# Patient Record
Sex: FEMALE | Race: WHITE | NOT HISPANIC OR LATINO | URBAN - METROPOLITAN AREA
[De-identification: names, ages, dates, MRNs, and addresses within clinical notes are randomized per-mention and may not be internally consistent; named-entity substitution may affect disease eponyms.]

---

## 2018-04-15 ENCOUNTER — INPATIENT (INPATIENT)
Facility: HOSPITAL | Age: 78
LOS: 2 days | Discharge: NURSING, REHAB OR LONG-TERM CARE | End: 2018-04-18
Attending: HOSPITALIST | Admitting: HOSPITALIST
Payer: MEDICARE

## 2018-04-15 VITALS
OXYGEN SATURATION: 100 % | HEART RATE: 88 BPM | TEMPERATURE: 100 F | RESPIRATION RATE: 16 BRPM | DIASTOLIC BLOOD PRESSURE: 80 MMHG | SYSTOLIC BLOOD PRESSURE: 183 MMHG

## 2018-04-15 DIAGNOSIS — M15.0 PRIMARY GENERALIZED (OSTEO)ARTHRITIS: ICD-10-CM

## 2018-04-15 DIAGNOSIS — Z29.9 ENCOUNTER FOR PROPHYLACTIC MEASURES, UNSPECIFIED: ICD-10-CM

## 2018-04-15 DIAGNOSIS — R53.1 WEAKNESS: ICD-10-CM

## 2018-04-15 LAB
ALBUMIN SERPL ELPH-MCNC: 4 G/DL — SIGNIFICANT CHANGE UP (ref 3.3–5)
ALP SERPL-CCNC: 126 U/L — HIGH (ref 40–120)
ALT FLD-CCNC: 10 U/L — SIGNIFICANT CHANGE UP (ref 4–33)
APPEARANCE UR: CLEAR — SIGNIFICANT CHANGE UP
AST SERPL-CCNC: 31 U/L — SIGNIFICANT CHANGE UP (ref 4–32)
BASOPHILS # BLD AUTO: 0.08 K/UL — SIGNIFICANT CHANGE UP (ref 0–0.2)
BASOPHILS NFR BLD AUTO: 1.3 % — SIGNIFICANT CHANGE UP (ref 0–2)
BILIRUB SERPL-MCNC: 0.4 MG/DL — SIGNIFICANT CHANGE UP (ref 0.2–1.2)
BILIRUB UR-MCNC: NEGATIVE — SIGNIFICANT CHANGE UP
BLOOD UR QL VISUAL: NEGATIVE — SIGNIFICANT CHANGE UP
BUN SERPL-MCNC: 15 MG/DL — SIGNIFICANT CHANGE UP (ref 7–23)
CALCIUM SERPL-MCNC: 9.2 MG/DL — SIGNIFICANT CHANGE UP (ref 8.4–10.5)
CHLORIDE SERPL-SCNC: 103 MMOL/L — SIGNIFICANT CHANGE UP (ref 98–107)
CK MB BLD-MCNC: 3.01 NG/ML — SIGNIFICANT CHANGE UP (ref 1–4.7)
CK MB BLD-MCNC: SIGNIFICANT CHANGE UP (ref 0–2.5)
CK SERPL-CCNC: 103 U/L — SIGNIFICANT CHANGE UP (ref 25–170)
CO2 SERPL-SCNC: 24 MMOL/L — SIGNIFICANT CHANGE UP (ref 22–31)
COLOR SPEC: SIGNIFICANT CHANGE UP
CREAT SERPL-MCNC: 0.67 MG/DL — SIGNIFICANT CHANGE UP (ref 0.5–1.3)
CRP SERPL-MCNC: < 5 MG/L — SIGNIFICANT CHANGE UP
EOSINOPHIL # BLD AUTO: 0.08 K/UL — SIGNIFICANT CHANGE UP (ref 0–0.5)
EOSINOPHIL NFR BLD AUTO: 1.3 % — SIGNIFICANT CHANGE UP (ref 0–6)
ERYTHROCYTE [SEDIMENTATION RATE] IN BLOOD: 6 MM/HR — SIGNIFICANT CHANGE UP (ref 4–25)
GLUCOSE SERPL-MCNC: 88 MG/DL — SIGNIFICANT CHANGE UP (ref 70–99)
GLUCOSE UR-MCNC: NEGATIVE — SIGNIFICANT CHANGE UP
HCT VFR BLD CALC: 37.7 % — SIGNIFICANT CHANGE UP (ref 34.5–45)
HGB BLD-MCNC: 12.3 G/DL — SIGNIFICANT CHANGE UP (ref 11.5–15.5)
IMM GRANULOCYTES # BLD AUTO: 0.01 # — SIGNIFICANT CHANGE UP
IMM GRANULOCYTES NFR BLD AUTO: 0.2 % — SIGNIFICANT CHANGE UP (ref 0–1.5)
KETONES UR-MCNC: NEGATIVE — SIGNIFICANT CHANGE UP
LEUKOCYTE ESTERASE UR-ACNC: HIGH
LYMPHOCYTES # BLD AUTO: 1.16 K/UL — SIGNIFICANT CHANGE UP (ref 1–3.3)
LYMPHOCYTES # BLD AUTO: 18.3 % — SIGNIFICANT CHANGE UP (ref 13–44)
MAGNESIUM SERPL-MCNC: 2.2 MG/DL — SIGNIFICANT CHANGE UP (ref 1.6–2.6)
MCHC RBC-ENTMCNC: 30.4 PG — SIGNIFICANT CHANGE UP (ref 27–34)
MCHC RBC-ENTMCNC: 32.6 % — SIGNIFICANT CHANGE UP (ref 32–36)
MCV RBC AUTO: 93.3 FL — SIGNIFICANT CHANGE UP (ref 80–100)
MONOCYTES # BLD AUTO: 0.61 K/UL — SIGNIFICANT CHANGE UP (ref 0–0.9)
MONOCYTES NFR BLD AUTO: 9.6 % — SIGNIFICANT CHANGE UP (ref 2–14)
MUCOUS THREADS # UR AUTO: SIGNIFICANT CHANGE UP
NEUTROPHILS # BLD AUTO: 4.41 K/UL — SIGNIFICANT CHANGE UP (ref 1.8–7.4)
NEUTROPHILS NFR BLD AUTO: 69.3 % — SIGNIFICANT CHANGE UP (ref 43–77)
NITRITE UR-MCNC: NEGATIVE — SIGNIFICANT CHANGE UP
NRBC # FLD: 0 — SIGNIFICANT CHANGE UP
PH UR: 6.5 — SIGNIFICANT CHANGE UP (ref 4.6–8)
PHOSPHATE SERPL-MCNC: 3.9 MG/DL — SIGNIFICANT CHANGE UP (ref 2.5–4.5)
PLATELET # BLD AUTO: 208 K/UL — SIGNIFICANT CHANGE UP (ref 150–400)
PMV BLD: 9.8 FL — SIGNIFICANT CHANGE UP (ref 7–13)
POTASSIUM SERPL-MCNC: 5.8 MMOL/L — HIGH (ref 3.5–5.3)
POTASSIUM SERPL-SCNC: 5.8 MMOL/L — HIGH (ref 3.5–5.3)
PROT SERPL-MCNC: 6.4 G/DL — SIGNIFICANT CHANGE UP (ref 6–8.3)
PROT UR-MCNC: 10 MG/DL — SIGNIFICANT CHANGE UP
RBC # BLD: 4.04 M/UL — SIGNIFICANT CHANGE UP (ref 3.8–5.2)
RBC # FLD: 13.2 % — SIGNIFICANT CHANGE UP (ref 10.3–14.5)
RBC CASTS # UR COMP ASSIST: SIGNIFICANT CHANGE UP (ref 0–?)
SODIUM SERPL-SCNC: 141 MMOL/L — SIGNIFICANT CHANGE UP (ref 135–145)
SP GR SPEC: 1.01 — SIGNIFICANT CHANGE UP (ref 1–1.04)
SQUAMOUS # UR AUTO: SIGNIFICANT CHANGE UP
TSH SERPL-MCNC: 1.15 UIU/ML — SIGNIFICANT CHANGE UP (ref 0.27–4.2)
UROBILINOGEN FLD QL: NORMAL MG/DL — SIGNIFICANT CHANGE UP
WBC # BLD: 6.35 K/UL — SIGNIFICANT CHANGE UP (ref 3.8–10.5)
WBC # FLD AUTO: 6.35 K/UL — SIGNIFICANT CHANGE UP (ref 3.8–10.5)
WBC UR QL: HIGH (ref 0–?)

## 2018-04-15 PROCEDURE — 71045 X-RAY EXAM CHEST 1 VIEW: CPT | Mod: 26

## 2018-04-15 PROCEDURE — 99223 1ST HOSP IP/OBS HIGH 75: CPT

## 2018-04-15 PROCEDURE — 72170 X-RAY EXAM OF PELVIS: CPT | Mod: 26

## 2018-04-15 RX ORDER — CELECOXIB 200 MG/1
200 CAPSULE ORAL DAILY
Qty: 0 | Refills: 0 | Status: DISCONTINUED | OUTPATIENT
Start: 2018-04-15 | End: 2018-04-18

## 2018-04-15 RX ORDER — ENOXAPARIN SODIUM 100 MG/ML
40 INJECTION SUBCUTANEOUS EVERY 24 HOURS
Qty: 0 | Refills: 0 | Status: DISCONTINUED | OUTPATIENT
Start: 2018-04-15 | End: 2018-04-18

## 2018-04-15 NOTE — ED ADULT TRIAGE NOTE - CHIEF COMPLAINT QUOTE
Pt reports  c/o legs and hips pain 2/2 mechanical fall this am Pt denies LOC or anticoagulant therapy, Positive impact to forehead no lacerations or hematoma noted.  Pt reports becoming progressively less-ambulatory and progressively wprsening urinary incontinence since a fall in february after which no imaging of hips were done.

## 2018-04-15 NOTE — H&P ADULT - NSHPLABSRESULTS_GEN_ALL_CORE
04-15    141  |  103  |  15  ----------------------------<  88  5.8<H>   |  24  |  0.67    Ca    9.2      15 Apr 2018 16:00  Phos  3.9     04-15  Mg     2.2     04-15    TPro  6.4  /  Alb  4.0  /  TBili  0.4  /  DBili  x   /  AST  31  /  ALT  10  /  AlkPhos  126<H>  04-15                            12.3   6.35  )-----------( 208      ( 15 Apr 2018 16:00 )             37.7       CARDIAC MARKERS ( 15 Apr 2018 16:00 )  x     / x     / 103 u/L / 3.01 ng/mL / x                Urinalysis Basic - ( 15 Apr 2018 16:05 )    Color: PLYEL / Appearance: CLEAR / S.012 / pH: 6.5  Gluc: NEGATIVE / Ketone: NEGATIVE  / Bili: NEGATIVE / Urobili: NORMAL mg/dL   Blood: NEGATIVE / Protein: 10 mg/dL / Nitrite: NEGATIVE   Leuk Esterase: SMALL / RBC: 0-2 / WBC 5-10   Sq Epi: OCC / Non Sq Epi: x / Bacteria: x    CXR film reviewed: clear lungs  Xray pelvis reviewed: no fracture

## 2018-04-15 NOTE — H&P ADULT - HISTORY OF PRESENT ILLNESS
78 y/o F without significant PMH p/w difficulty ambulating and hip florentin 78 y/o F without significant PMH p/w difficulty ambulating and pain in hips, knees and lower back.  Pt reports arthritis in her hips and knees in the past.  She fell in February, and since then has had worse pain in hips and knees and lower back than prior.  She has had difficulty walking 2/2 the pain, and over the past 2 months has felt progressively weaker.  She mostly experiences pain on standing and walking, and has relief when sitting or laying down.  She has not had numbness or paresthesia.  She takes ibuprofen which provides temporary relief.

## 2018-04-15 NOTE — ED ADULT NURSE NOTE - OBJECTIVE STATEMENT
Pt received in 316, aaox3 with c/o ongoing b/l hip pain worse r>l, lower back pain and b/l lower extremity pain r/t a fall in February 2018. Pt states that she has had chronic pain since her fall which limits her ability to ambulate. Denies any recent falls or injuries. Pt able to transfer with assist and ambulate short distances with assist. Denies the use of any assistive devices to ambulate.

## 2018-04-15 NOTE — ED PROVIDER NOTE - ATTENDING CONTRIBUTION TO CARE
[place]   pt presenting with 2 mos of worsening difficulty with ambulation which began with a fall  She notes b/l LE weakness also noting UE weakness as well There has been decrease in appetite  ?wt loss  no cardiopulmonary sxs  no fevers new GI sxs   exam  pt with clear lungs  card RRR [id number] nontender  no organomegaly palpated    neuro nl CN  nl sensation  pt appears to have prox weakness both upper and lower exts    pt w/u  CBC lytes esr CRP  TSH  CPK-  pending at time of signover

## 2018-04-15 NOTE — ED PROVIDER NOTE - PHYSICAL EXAMINATION
General: NAD, sitting comfortably in bed  Neurology: A&Ox3, nonfocal, 4/5 strength bilateral upper and lower extremities, bilateral LE tenderness at knees, hips  Head:  Normocephalic, atraumatic  ENT:  Mucosa moist, no ulcerations  Neck:  Supple, no sinuses or palpable masses  Lymphatic:  No palpable cervical, supraclavicular adenopathy  Respiratory: CTA B/L  CV: RRR, S1S2, no murmur  Abdominal: Soft, NT, ND no palpable mass  MSK: 2+ bilateral LE edema, + peripheral pulses, FROM all 4 extremity +severe scoliosis

## 2018-04-15 NOTE — ED PROVIDER NOTE - OBJECTIVE STATEMENT
78 y/o F w/ no PMH p/w leg and hip pain worsening since february. in february pt had a fall, hit her head, had pain afterwards. back, hip and leg pain persisted and continues to get worse. pt explains that pain has developed to the point where she is not longer able to ambulate. she has to constantly be holding onto something when ambulating. does not use a walker. she explains that R leg pain is worse than L. pain described as sharp, tingling, unstable gait. +associated urinary incontinence, no dysuria. normal BMs. +LE edema. as per domestic partner, pt has not been active for months, explains that she is weak and needs assistance. No CP, SOB, N/V, abd pain, diarrhea, headache.

## 2018-04-15 NOTE — ED PROVIDER NOTE - PROGRESS NOTE DETAILS
Walked with patient, unable to ambulate. Admitting patient for inability to ambulate. Spoke with [facility] hospitalist on call.

## 2018-04-15 NOTE — ED PROVIDER NOTE - MEDICAL DECISION MAKING DETAILS
78 y/o F w/ no PMH p/w leg and hip pain, progressively worsening ambulation, will get labs, imaging. 76 y/o F w/ no PMH p/w leg and hip pain, progressively worsening ambulation, will get labs, imaging. all wnl, admitting for inability to ambulate.

## 2018-04-15 NOTE — H&P ADULT - NSHPREVIEWOFSYSTEMS_GEN_ALL_CORE
REVIEW OF SYSTEMS    General:  Negative  Skin/Breast:  Negative  Ophthalmologic:  Negative  ENMT:  Negative  Respiratory and Thorax:  Negative  Cardiovascular:  Negative  Gastrointestinal:  Negative  Genitourinary:  Negative  Musculoskeletal:  Negative  Neurological:  Negative  Psychiatric:  Negative  Hematology/Lymphatics:  Negative	  Endocrine:	  Negative  Allergic/Immunologic:	 Negative REVIEW OF SYSTEMS    General:  Negative  Skin/Breast:  Negative  Ophthalmologic:  Negative  ENMT:  Negative  Respiratory and Thorax:  Negative  Cardiovascular:  Negative  Gastrointestinal:  Negative  Genitourinary:  Negative  Musculoskeletal:  back hip and knee pain as described  Neurological:  No numbness/paresthesia   Psychiatric:  Negative  Hematology/Lymphatics:  Negative	  Endocrine:	  Negative  Allergic/Immunologic:	 Negative

## 2018-04-15 NOTE — H&P ADULT - NSHPPHYSICALEXAM_GEN_ALL_CORE
Vital Signs Last 24 Hrs  T(C): 37.4 (15 Apr 2018 18:19), Max: 37.7 (15 Apr 2018 16:13)  T(F): 99.4 (15 Apr 2018 18:19), Max: 99.9 (15 Apr 2018 16:13)  HR: 75 (15 Apr 2018 18:19) (75 - 88)  BP: 179/92 (15 Apr 2018 18:19) (179/92 - 183/80)  BP(mean): --  RR: 18 (15 Apr 2018 18:19) (16 - 18)  SpO2: 99% (15 Apr 2018 18:19) (99% - 100%)    PHYSICAL EXAM:  GENERAL: No Acute Distress  HEAD:  Atraumatic, Normocephalic  EYES: EOMI, PERRL, conjunctiva and sclera clear  ENMT: Moist mucous membranes   NECK: Supple  CHEST/LUNG: Clear to auscultation bilaterally  HEART: Regular rate and rhythm; No murmurs, rubs, or gallops  ABDOMEN: Soft, Nontender, Nondistended; Bowel sounds normal  EXTREMITIES:   No clubbing, cyanosis, or pedal edema  VASCULAR: Normal DP pulses  PSYCH: Normal Affect, Normal Behavior  NEUROLOGY:   - Mental status A&O x 3,  - Cranial Nerves II-XII intact  - Motor   - Sensory  - Coordination  SKIN: No rashes or lesions  MUSCULOSKELETAL: No joint swelling Vital Signs Last 24 Hrs  T(C): 37.4 (15 Apr 2018 18:19), Max: 37.7 (15 Apr 2018 16:13)  T(F): 99.4 (15 Apr 2018 18:19), Max: 99.9 (15 Apr 2018 16:13)  HR: 75 (15 Apr 2018 18:19) (75 - 88)  BP: 179/92 (15 Apr 2018 18:19) (179/92 - 183/80)  BP(mean): --  RR: 18 (15 Apr 2018 18:19) (16 - 18)  SpO2: 99% (15 Apr 2018 18:19) (99% - 100%)    PHYSICAL EXAM:  GENERAL: No Acute Distress  EYES: conjunctiva and sclera clear  ENMT: Moist mucous membranes   NECK: Supple  CHEST/LUNG: Clear to auscultation bilaterally  HEART: Regular rate and rhythm; No murmurs, rubs, or gallops  ABDOMEN: Soft, Nontender, Nondistended; Bowel sounds normal  EXTREMITIES:   No clubbing, cyanosis, or pedal edema  VASCULAR: Normal DP pulses  PSYCH: Normal Affect, Normal Behavior  NEUROLOGY:   - Mental status A&O x 3,  - Motor: strength 5/5 BLE  - Sensory: normal light touch BLE's  SKIN: No rashes or lesions  MUSCULOSKELETAL: No joint swelling or tenderness.  Normal straight leg raise

## 2018-04-15 NOTE — H&P ADULT - PROBLEM SELECTOR PLAN 1
Likely 2/2 deconditioning in the setting of increased pain, and decreased ambulation from OA.  No signs of spinal cord or nerve root impingement.  - PT eval

## 2018-04-15 NOTE — H&P ADULT - PROBLEM SELECTOR PLAN 3
Lovenox    IMPROVE VTE Individual Risk Assessment          RISK                                                          Points  [  ] Previous VTE                                                3  [  ] Thrombophilia                                             2  [  ] Lower limb paralysis                                   2        (unable to hold up >15 seconds)    [  ] Current Cancer                                             2         (within 6 months)  [x] Immobilization > 24 hrs                              1  [  ] ICU/CCU stay > 24 hours                             1  [x] Age > 60                                                         1    IMPROVE VTE Score: 2

## 2018-04-15 NOTE — ED PROVIDER NOTE - NS ED ROS FT
REVIEW OF SYSTEMS:  CONSTITUTIONAL: No fevers or chills +weakness  EYES/ENT: No visual changes;  No vertigo or throat pain   NECK: No pain or stiffness  RESPIRATORY: No cough, wheezing, hemoptysis; No shortness of breath  CARDIOVASCULAR: No chest pain or palpitations  GASTROINTESTINAL: No abdominal or epigastric pain. No nausea, vomiting, or hematemesis; No diarrhea or constipation. No melena or hematochezia.  GENITOURINARY: No dysuria, frequency or hematuria  NEUROLOGICAL: +LE tingling, weakness, ataxia  SKIN: No itching, burning, rashes, or lesions   All other review of systems is negative unless indicated above.

## 2018-04-16 RX ORDER — INFLUENZA VIRUS VACCINE 15; 15; 15; 15 UG/.5ML; UG/.5ML; UG/.5ML; UG/.5ML
0.5 SUSPENSION INTRAMUSCULAR ONCE
Qty: 0 | Refills: 0 | Status: COMPLETED | OUTPATIENT
Start: 2018-04-16 | End: 2018-04-16

## 2018-04-16 RX ADMIN — CELECOXIB 200 MILLIGRAM(S): 200 CAPSULE ORAL at 11:32

## 2018-04-16 NOTE — PROGRESS NOTE ADULT - SUBJECTIVE AND OBJECTIVE BOX
Patient is a 77y old  Female who presents with a chief complaint of Hip pain, difficulty walking (15 Apr 2018 21:29)      SUBJECTIVE / OVERNIGHT EVENTS:  here for pain control.   feel well.  no cp, no sob, no n/v/d. no abdominal pain.  no headache, no dizziness.       Vital Signs Last 24 Hrs  T(C): 36.9 (2018 06:29), Max: 37.7 (15 Apr 2018 16:13)  T(F): 98.4 (2018 06:29), Max: 99.9 (15 Apr 2018 16:13)  HR: 70 (:29) (69 - 88)  BP: 144/80 (2018 06:29) (126/62 - 183/80)  BP(mean): --  RR: 18 (2018 06:29) (16 - 18)  SpO2: 98% (2018 06:) (97% - 100%)  I&O's Summary      PHYSICAL EXAM:  GENERAL: NAD, Comfortable, thin elderly  HEAD:  Atraumatic, Normocephalic  EYES: EOMI, PERRLA, conjunctiva and sclera clear  NECK: Supple, No JVD  CHEST/LUNG: Clear to auscultation bilaterally; No wheeze  HEART: Regular rate and rhythm; No murmurs, rubs, or gallops  ABDOMEN: Soft, Nontender, Nondistended; Bowel sounds present  Neuro: AAO x 3, no focal deficit, 5/5 b/l extremities  EXTREMITIES:  2+ Peripheral Pulses, No clubbing, cyanosis, or edema  SKIN: No rashes or lesions    LABS:                        12.3   6.35  )-----------( 208      ( 15 Apr 2018 16:00 )             37.7     04-15    141  |  103  |  15  ----------------------------<  88  5.8<H>   |  24  |  0.67    Ca    9.2      15 Apr 2018 16:00  Phos  3.9     04-15  Mg     2.2     04-15    TPro  6.4  /  Alb  4.0  /  TBili  0.4  /  DBili  x   /  AST  31  /  ALT  10  /  AlkPhos  126<H>  04-15      CAPILLARY BLOOD GLUCOSE        CARDIAC MARKERS ( 15 Apr 2018 16:00 )  x     / x     / 103 u/L / 3.01 ng/mL / x          Urinalysis Basic - ( 15 Apr 2018 16:05 )    Color: PLYEL / Appearance: CLEAR / S.012 / pH: 6.5  Gluc: NEGATIVE / Ketone: NEGATIVE  / Bili: NEGATIVE / Urobili: NORMAL mg/dL   Blood: NEGATIVE / Protein: 10 mg/dL / Nitrite: NEGATIVE   Leuk Esterase: SMALL / RBC: 0-2 / WBC 5-10   Sq Epi: OCC / Non Sq Epi: x / Bacteria: x        RADIOLOGY & ADDITIONAL TESTS:    Imaging Personally Reviewed:  [x] YES  [ ] NO    Consultant(s) Notes Reviewed:  [x] YES  [ ] NO      MEDICATIONS  (STANDING):  celecoxib 200 milliGRAM(s) Oral daily  enoxaparin Injectable 40 milliGRAM(s) SubCutaneous every 24 hours    MEDICATIONS  (PRN):      Care Discussed with Consultants/Other Providers [x] YES  [ ] NO    HEALTH ISSUES - PROBLEM Dx:  Prophylactic measure: Prophylactic measure  Primary osteoarthritis involving multiple joints: Primary osteoarthritis involving multiple joints  Weakness: Weakness

## 2018-04-16 NOTE — PROGRESS NOTE ADULT - PROBLEM SELECTOR PLAN 1
Likely 2/2 deconditioning in the setting of increased pain, and decreased ambulation from OA.  No signs of spinal cord or nerve root impingement.  physical therapy eval.  Lives alone.  Social work consult.

## 2018-04-17 LAB
ALBUMIN SERPL ELPH-MCNC: 3.4 G/DL — SIGNIFICANT CHANGE UP (ref 3.3–5)
ALP SERPL-CCNC: 116 U/L — SIGNIFICANT CHANGE UP (ref 40–120)
ALT FLD-CCNC: 12 U/L — SIGNIFICANT CHANGE UP (ref 4–33)
AST SERPL-CCNC: 19 U/L — SIGNIFICANT CHANGE UP (ref 4–32)
BASOPHILS # BLD AUTO: 0.06 K/UL — SIGNIFICANT CHANGE UP (ref 0–0.2)
BASOPHILS NFR BLD AUTO: 1.2 % — SIGNIFICANT CHANGE UP (ref 0–2)
BILIRUB SERPL-MCNC: 0.4 MG/DL — SIGNIFICANT CHANGE UP (ref 0.2–1.2)
BUN SERPL-MCNC: 16 MG/DL — SIGNIFICANT CHANGE UP (ref 7–23)
CALCIUM SERPL-MCNC: 8.9 MG/DL — SIGNIFICANT CHANGE UP (ref 8.4–10.5)
CHLORIDE SERPL-SCNC: 102 MMOL/L — SIGNIFICANT CHANGE UP (ref 98–107)
CO2 SERPL-SCNC: 28 MMOL/L — SIGNIFICANT CHANGE UP (ref 22–31)
CREAT SERPL-MCNC: 0.77 MG/DL — SIGNIFICANT CHANGE UP (ref 0.5–1.3)
EOSINOPHIL # BLD AUTO: 0.22 K/UL — SIGNIFICANT CHANGE UP (ref 0–0.5)
EOSINOPHIL NFR BLD AUTO: 4.4 % — SIGNIFICANT CHANGE UP (ref 0–6)
GLUCOSE SERPL-MCNC: 89 MG/DL — SIGNIFICANT CHANGE UP (ref 70–99)
HCT VFR BLD CALC: 37.6 % — SIGNIFICANT CHANGE UP (ref 34.5–45)
HGB BLD-MCNC: 12.3 G/DL — SIGNIFICANT CHANGE UP (ref 11.5–15.5)
IMM GRANULOCYTES # BLD AUTO: 0.02 # — SIGNIFICANT CHANGE UP
IMM GRANULOCYTES NFR BLD AUTO: 0.4 % — SIGNIFICANT CHANGE UP (ref 0–1.5)
LYMPHOCYTES # BLD AUTO: 1.63 K/UL — SIGNIFICANT CHANGE UP (ref 1–3.3)
LYMPHOCYTES # BLD AUTO: 32.3 % — SIGNIFICANT CHANGE UP (ref 13–44)
MCHC RBC-ENTMCNC: 30.8 PG — SIGNIFICANT CHANGE UP (ref 27–34)
MCHC RBC-ENTMCNC: 32.7 % — SIGNIFICANT CHANGE UP (ref 32–36)
MCV RBC AUTO: 94 FL — SIGNIFICANT CHANGE UP (ref 80–100)
MONOCYTES # BLD AUTO: 0.47 K/UL — SIGNIFICANT CHANGE UP (ref 0–0.9)
MONOCYTES NFR BLD AUTO: 9.3 % — SIGNIFICANT CHANGE UP (ref 2–14)
NEUTROPHILS # BLD AUTO: 2.65 K/UL — SIGNIFICANT CHANGE UP (ref 1.8–7.4)
NEUTROPHILS NFR BLD AUTO: 52.4 % — SIGNIFICANT CHANGE UP (ref 43–77)
NRBC # FLD: 0 — SIGNIFICANT CHANGE UP
PLATELET # BLD AUTO: 209 K/UL — SIGNIFICANT CHANGE UP (ref 150–400)
PMV BLD: 9.8 FL — SIGNIFICANT CHANGE UP (ref 7–13)
POTASSIUM SERPL-MCNC: 4.2 MMOL/L — SIGNIFICANT CHANGE UP (ref 3.5–5.3)
POTASSIUM SERPL-SCNC: 4.2 MMOL/L — SIGNIFICANT CHANGE UP (ref 3.5–5.3)
PROT SERPL-MCNC: 5.8 G/DL — LOW (ref 6–8.3)
RBC # BLD: 4 M/UL — SIGNIFICANT CHANGE UP (ref 3.8–5.2)
RBC # FLD: 13.2 % — SIGNIFICANT CHANGE UP (ref 10.3–14.5)
SODIUM SERPL-SCNC: 140 MMOL/L — SIGNIFICANT CHANGE UP (ref 135–145)
WBC # BLD: 5.05 K/UL — SIGNIFICANT CHANGE UP (ref 3.8–10.5)
WBC # FLD AUTO: 5.05 K/UL — SIGNIFICANT CHANGE UP (ref 3.8–10.5)

## 2018-04-17 RX ADMIN — CELECOXIB 200 MILLIGRAM(S): 200 CAPSULE ORAL at 11:07

## 2018-04-17 NOTE — PROGRESS NOTE ADULT - PROBLEM SELECTOR PLAN 1
Likely 2/2 deconditioning in the setting of increased pain, and decreased ambulation from OA flare.   No signs of spinal cord or nerve root impingement.  physical therapy eval.  Lives alone.  Social work consult.  Plan for rehab.   Pt able to ambulate with walker, although slow with movements.

## 2018-04-17 NOTE — PROGRESS NOTE ADULT - ATTENDING COMMENTS
Lan Sofia will be covering me starting 4/18/18. He can be reached at  if needed.     - Dr. MELIDA Giles ([facility])  - [phone]

## 2018-04-17 NOTE — PROGRESS NOTE ADULT - ASSESSMENT
78 y/o F with no significant medical history, presented with difficulty walking 2/2 hip pain. Lives alone.

## 2018-04-17 NOTE — DISCHARGE NOTE ADULT - CARE PLAN
Principal Discharge DX:	Weakness  Goal:	Physical therapy recommended rehab  Assessment and plan of treatment:	Please follow up with your PMD upon discharge, call to schedule appointment  Secondary Diagnosis:	Primary osteoarthritis involving multiple joints  Goal:	Celebrex added  Assessment and plan of treatment:	Please follow up with your PMD upon discharge, call to schedule appointment

## 2018-04-17 NOTE — DISCHARGE NOTE ADULT - PLAN OF CARE
Physical therapy recommended rehab Please follow up with your PMD upon discharge, call to schedule appointment Celebrex added

## 2018-04-17 NOTE — DISCHARGE NOTE ADULT - MEDICATION SUMMARY - MEDICATIONS TO TAKE
I will START or STAY ON the medications listed below when I get home from the hospital:    celecoxib 200 mg oral capsule  -- 1 cap(s) by mouth once a day  -- Indication: For Primary osteoarthritis involving multiple joints    enoxaparin  -- 40 milligram(s) subcutaneous once a day  -- Indication: For Prophylactic measure

## 2018-04-17 NOTE — PROGRESS NOTE ADULT - SUBJECTIVE AND OBJECTIVE BOX
Patient is a 77y old  Female who presents with a chief complaint of Hip pain, difficulty walking (17 Apr 2018 09:43)      SUBJECTIVE / OVERNIGHT EVENTS:  No overnight events.  No complaints. Pain is actually well controlled.   No cp, sob, abdominal pain.  No n/v/d. no HA/dizziness.   Her close lady neighbor of 9 years at bedside.      Vital Signs Last 24 Hrs  T(C): 36.7 (17 Apr 2018 14:25), Max: 37.2 (16 Apr 2018 21:00)  T(F): 98 (17 Apr 2018 14:25), Max: 98.9 (16 Apr 2018 21:00)  HR: 82 (17 Apr 2018 14:25) (68 - 82)  BP: 125/82 (17 Apr 2018 14:25) (121/88 - 130/81)  BP(mean): --  RR: 18 (17 Apr 2018 14:25) (18 - 18)  SpO2: 97% (17 Apr 2018 14:25) (97% - 98%)  I&O's Summary      PHYSICAL EXAM:  GENERAL: NAD, Comfortable, thin elderly  HEAD:  Atraumatic, Normocephalic  EYES: EOMI, PERRLA, conjunctiva and sclera clear  NECK: Supple, No JVD  CHEST/LUNG: Clear to auscultation bilaterally; No wheeze  HEART: Regular rate and rhythm; No murmurs, rubs, or gallops  ABDOMEN: Soft, Nontender, Nondistended; Bowel sounds present  Neuro: AAO x 3, no focal deficit, 5/5 b/l extremities  EXTREMITIES:  2+ Peripheral Pulses, No clubbing, cyanosis, or edema  SKIN: forehead skin lesions     LABS:                        12.3   5.05  )-----------( 209      ( 17 Apr 2018 05:55 )             37.6     04-17    140  |  102  |  16  ----------------------------<  89  4.2   |  28  |  0.77    Ca    8.9      17 Apr 2018 05:55    TPro  5.8<L>  /  Alb  3.4  /  TBili  0.4  /  DBili  x   /  AST  19  /  ALT  12  /  AlkPhos  116  04-17      CAPILLARY BLOOD GLUCOSE                RADIOLOGY & ADDITIONAL TESTS:    Imaging Personally Reviewed:  [x] YES  [ ] NO    Consultant(s) Notes Reviewed:  [x] YES  [ ] NO      MEDICATIONS  (STANDING):  celecoxib 200 milliGRAM(s) Oral daily  enoxaparin Injectable 40 milliGRAM(s) SubCutaneous every 24 hours    MEDICATIONS  (PRN):      Care Discussed with Consultants/Other Providers [x] YES  [ ] NO    HEALTH ISSUES - PROBLEM Dx:  Prophylactic measure: Prophylactic measure  Primary osteoarthritis involving multiple joints: Primary osteoarthritis involving multiple joints  Weakness: Weakness

## 2018-04-17 NOTE — DISCHARGE NOTE ADULT - HOSPITAL COURSE
76 y/o F with no significant pmh p/w difficulty walking 2/2 hip pain. Lives alone.      Problem/Plan - 1:  ·  Problem: Weakness.  Plan: Likely 2/2 deconditioning in the setting of increased pain, and decreased ambulation from OA.  No signs of spinal cord or nerve root impingement.  physical therapy eval.  Lives alone.  Social work consult.      Problem/Plan - 2:  ·  Problem: Primary osteoarthritis involving multiple joints.  Plan: - doing well on a trial of Celebrex.      Problem/Plan - 3:  ·  Problem: Prophylactic measure.  Plan: Lovenox    4/17/2018 Reviewed patient's bloodwork and case with Dr. Giles - patient medically cleared for discharge to rehab today 76 y/o F with no significant pmh p/w difficulty walking 2/2 hip pain. Lives alone.      Problem/Plan - 1:  ·  Problem: Weakness.  Plan: Likely 2/2 deconditioning in the setting of increased pain, and decreased ambulation from OA.  No signs of spinal cord or nerve root impingement.  physical therapy recommends inpatient rehab for patient         Problem/Plan - 2:  ·  Problem: Primary osteoarthritis involving multiple joints.  Plan: - doing well on a trial of Celebrex.      Problem/Plan - 3:  ·  Problem: Prophylactic measure.  Plan: Lovenox    4/17/2018 Reviewed patient's bloodwork and case with Dr. Giles - patient medically cleared for discharge to rehab today  DIspo: ANGELES

## 2018-04-18 VITALS
DIASTOLIC BLOOD PRESSURE: 82 MMHG | OXYGEN SATURATION: 97 % | RESPIRATION RATE: 18 BRPM | HEART RATE: 77 BPM | TEMPERATURE: 98 F | SYSTOLIC BLOOD PRESSURE: 144 MMHG

## 2018-04-18 LAB
ALBUMIN SERPL ELPH-MCNC: 3.3 G/DL — SIGNIFICANT CHANGE UP (ref 3.3–5)
ALP SERPL-CCNC: 128 U/L — HIGH (ref 40–120)
ALT FLD-CCNC: 11 U/L — SIGNIFICANT CHANGE UP (ref 4–33)
AST SERPL-CCNC: 23 U/L — SIGNIFICANT CHANGE UP (ref 4–32)
BASOPHILS # BLD AUTO: 0.07 K/UL — SIGNIFICANT CHANGE UP (ref 0–0.2)
BASOPHILS NFR BLD AUTO: 1.4 % — SIGNIFICANT CHANGE UP (ref 0–2)
BILIRUB SERPL-MCNC: 0.3 MG/DL — SIGNIFICANT CHANGE UP (ref 0.2–1.2)
BUN SERPL-MCNC: 18 MG/DL — SIGNIFICANT CHANGE UP (ref 7–23)
CALCIUM SERPL-MCNC: 8.8 MG/DL — SIGNIFICANT CHANGE UP (ref 8.4–10.5)
CHLORIDE SERPL-SCNC: 103 MMOL/L — SIGNIFICANT CHANGE UP (ref 98–107)
CO2 SERPL-SCNC: 27 MMOL/L — SIGNIFICANT CHANGE UP (ref 22–31)
CREAT SERPL-MCNC: 0.7 MG/DL — SIGNIFICANT CHANGE UP (ref 0.5–1.3)
EOSINOPHIL # BLD AUTO: 0.28 K/UL — SIGNIFICANT CHANGE UP (ref 0–0.5)
EOSINOPHIL NFR BLD AUTO: 5.4 % — SIGNIFICANT CHANGE UP (ref 0–6)
GLUCOSE SERPL-MCNC: 91 MG/DL — SIGNIFICANT CHANGE UP (ref 70–99)
HCT VFR BLD CALC: 38.4 % — SIGNIFICANT CHANGE UP (ref 34.5–45)
HGB BLD-MCNC: 12.3 G/DL — SIGNIFICANT CHANGE UP (ref 11.5–15.5)
IMM GRANULOCYTES # BLD AUTO: 0.01 # — SIGNIFICANT CHANGE UP
IMM GRANULOCYTES NFR BLD AUTO: 0.2 % — SIGNIFICANT CHANGE UP (ref 0–1.5)
LYMPHOCYTES # BLD AUTO: 1.56 K/UL — SIGNIFICANT CHANGE UP (ref 1–3.3)
LYMPHOCYTES # BLD AUTO: 30.1 % — SIGNIFICANT CHANGE UP (ref 13–44)
MCHC RBC-ENTMCNC: 30 PG — SIGNIFICANT CHANGE UP (ref 27–34)
MCHC RBC-ENTMCNC: 32 % — SIGNIFICANT CHANGE UP (ref 32–36)
MCV RBC AUTO: 93.7 FL — SIGNIFICANT CHANGE UP (ref 80–100)
MONOCYTES # BLD AUTO: 0.52 K/UL — SIGNIFICANT CHANGE UP (ref 0–0.9)
MONOCYTES NFR BLD AUTO: 10 % — SIGNIFICANT CHANGE UP (ref 2–14)
NEUTROPHILS # BLD AUTO: 2.74 K/UL — SIGNIFICANT CHANGE UP (ref 1.8–7.4)
NEUTROPHILS NFR BLD AUTO: 52.9 % — SIGNIFICANT CHANGE UP (ref 43–77)
NRBC # FLD: 0 — SIGNIFICANT CHANGE UP
PLATELET # BLD AUTO: 217 K/UL — SIGNIFICANT CHANGE UP (ref 150–400)
PMV BLD: 9.8 FL — SIGNIFICANT CHANGE UP (ref 7–13)
POTASSIUM SERPL-MCNC: 4.4 MMOL/L — SIGNIFICANT CHANGE UP (ref 3.5–5.3)
POTASSIUM SERPL-SCNC: 4.4 MMOL/L — SIGNIFICANT CHANGE UP (ref 3.5–5.3)
PROT SERPL-MCNC: 5.6 G/DL — LOW (ref 6–8.3)
RBC # BLD: 4.1 M/UL — SIGNIFICANT CHANGE UP (ref 3.8–5.2)
RBC # FLD: 12.9 % — SIGNIFICANT CHANGE UP (ref 10.3–14.5)
SODIUM SERPL-SCNC: 139 MMOL/L — SIGNIFICANT CHANGE UP (ref 135–145)
WBC # BLD: 5.18 K/UL — SIGNIFICANT CHANGE UP (ref 3.8–10.5)
WBC # FLD AUTO: 5.18 K/UL — SIGNIFICANT CHANGE UP (ref 3.8–10.5)

## 2018-04-18 RX ORDER — CELECOXIB 200 MG/1
1 CAPSULE ORAL
Qty: 0 | Refills: 0 | COMMUNITY
Start: 2018-04-18

## 2018-04-18 RX ORDER — ENOXAPARIN SODIUM 100 MG/ML
40 INJECTION SUBCUTANEOUS
Qty: 0 | Refills: 0 | COMMUNITY
Start: 2018-04-18

## 2018-04-18 RX ADMIN — CELECOXIB 200 MILLIGRAM(S): 200 CAPSULE ORAL at 13:23

## 2023-06-20 NOTE — PROGRESS NOTE ADULT - PROBLEM SELECTOR PROBLEM 1
Pt has LVM requesting a call, as she states she is still having issues with receiving her CPAP machine   Please advise Weakness